# Patient Record
Sex: MALE | Race: WHITE | NOT HISPANIC OR LATINO | URBAN - METROPOLITAN AREA
[De-identification: names, ages, dates, MRNs, and addresses within clinical notes are randomized per-mention and may not be internally consistent; named-entity substitution may affect disease eponyms.]

---

## 2023-01-08 ENCOUNTER — EMERGENCY (EMERGENCY)
Facility: HOSPITAL | Age: 23
LOS: 1 days | Discharge: HOME | End: 2023-01-08
Attending: EMERGENCY MEDICINE | Admitting: EMERGENCY MEDICINE
Payer: COMMERCIAL

## 2023-01-08 VITALS
SYSTOLIC BLOOD PRESSURE: 132 MMHG | HEIGHT: 74 IN | HEART RATE: 76 BPM | WEIGHT: 220.02 LBS | OXYGEN SATURATION: 96 % | RESPIRATION RATE: 18 BRPM | DIASTOLIC BLOOD PRESSURE: 72 MMHG | TEMPERATURE: 98 F

## 2023-01-08 PROCEDURE — 99285 EMERGENCY DEPT VISIT HI MDM: CPT

## 2023-01-08 PROCEDURE — 99053 MED SERV 10PM-8AM 24 HR FAC: CPT

## 2023-01-08 RX ORDER — SIMETHICONE 80 MG/1
80 TABLET, CHEWABLE ORAL ONCE
Refills: 0 | Status: COMPLETED | OUTPATIENT
Start: 2023-01-08 | End: 2023-01-08

## 2023-01-08 RX ORDER — SODIUM CHLORIDE 9 MG/ML
1000 INJECTION INTRAMUSCULAR; INTRAVENOUS; SUBCUTANEOUS ONCE
Refills: 0 | Status: COMPLETED | OUTPATIENT
Start: 2023-01-08 | End: 2023-01-08

## 2023-01-08 RX ORDER — METOCLOPRAMIDE HCL 10 MG
10 TABLET ORAL ONCE
Refills: 0 | Status: COMPLETED | OUTPATIENT
Start: 2023-01-08 | End: 2023-01-08

## 2023-01-08 NOTE — ED ADULT TRIAGE NOTE - CHIEF COMPLAINT QUOTE
Walk in pt with complaints of chest pain/pressure accompanied by excessive belching since 4pm today after eating a meal.

## 2023-01-09 VITALS
SYSTOLIC BLOOD PRESSURE: 132 MMHG | OXYGEN SATURATION: 97 % | HEART RATE: 61 BPM | TEMPERATURE: 97 F | DIASTOLIC BLOOD PRESSURE: 80 MMHG | RESPIRATION RATE: 17 BRPM

## 2023-01-09 LAB
ALBUMIN SERPL ELPH-MCNC: 4.5 G/DL — SIGNIFICANT CHANGE UP (ref 3.4–5)
ALP SERPL-CCNC: 57 U/L — SIGNIFICANT CHANGE UP (ref 40–120)
ALT FLD-CCNC: 54 U/L — HIGH (ref 12–42)
ANION GAP SERPL CALC-SCNC: 8 MMOL/L — LOW (ref 9–16)
APTT BLD: 32 SEC — SIGNIFICANT CHANGE UP (ref 27.5–35.5)
AST SERPL-CCNC: 37 U/L — SIGNIFICANT CHANGE UP (ref 15–37)
BASOPHILS # BLD AUTO: 0.03 K/UL — SIGNIFICANT CHANGE UP (ref 0–0.2)
BASOPHILS NFR BLD AUTO: 0.4 % — SIGNIFICANT CHANGE UP (ref 0–2)
BILIRUB SERPL-MCNC: 1.3 MG/DL — HIGH (ref 0.2–1.2)
BUN SERPL-MCNC: 18 MG/DL — SIGNIFICANT CHANGE UP (ref 7–23)
CALCIUM SERPL-MCNC: 9.7 MG/DL — SIGNIFICANT CHANGE UP (ref 8.5–10.5)
CHLORIDE SERPL-SCNC: 104 MMOL/L — SIGNIFICANT CHANGE UP (ref 96–108)
CO2 SERPL-SCNC: 27 MMOL/L — SIGNIFICANT CHANGE UP (ref 22–31)
CREAT SERPL-MCNC: 0.94 MG/DL — SIGNIFICANT CHANGE UP (ref 0.5–1.3)
EGFR: 116 ML/MIN/1.73M2 — SIGNIFICANT CHANGE UP
EOSINOPHIL # BLD AUTO: 0.15 K/UL — SIGNIFICANT CHANGE UP (ref 0–0.5)
EOSINOPHIL NFR BLD AUTO: 2.1 % — SIGNIFICANT CHANGE UP (ref 0–6)
GLUCOSE SERPL-MCNC: 107 MG/DL — HIGH (ref 70–99)
HCT VFR BLD CALC: 43.6 % — SIGNIFICANT CHANGE UP (ref 39–50)
HGB BLD-MCNC: 15 G/DL — SIGNIFICANT CHANGE UP (ref 13–17)
HIV 1 & 2 AB SERPL IA.RAPID: SIGNIFICANT CHANGE UP
IMM GRANULOCYTES NFR BLD AUTO: 0.3 % — SIGNIFICANT CHANGE UP (ref 0–0.9)
INR BLD: 0.95 — SIGNIFICANT CHANGE UP (ref 0.88–1.16)
LIDOCAIN IGE QN: 88 U/L — SIGNIFICANT CHANGE UP (ref 73–393)
LYMPHOCYTES # BLD AUTO: 3.25 K/UL — SIGNIFICANT CHANGE UP (ref 1–3.3)
LYMPHOCYTES # BLD AUTO: 44.6 % — HIGH (ref 13–44)
MCHC RBC-ENTMCNC: 29.8 PG — SIGNIFICANT CHANGE UP (ref 27–34)
MCHC RBC-ENTMCNC: 34.4 GM/DL — SIGNIFICANT CHANGE UP (ref 32–36)
MCV RBC AUTO: 86.7 FL — SIGNIFICANT CHANGE UP (ref 80–100)
MONOCYTES # BLD AUTO: 0.99 K/UL — HIGH (ref 0–0.9)
MONOCYTES NFR BLD AUTO: 13.6 % — SIGNIFICANT CHANGE UP (ref 2–14)
NEUTROPHILS # BLD AUTO: 2.85 K/UL — SIGNIFICANT CHANGE UP (ref 1.8–7.4)
NEUTROPHILS NFR BLD AUTO: 39 % — LOW (ref 43–77)
NRBC # BLD: 0 /100 WBCS — SIGNIFICANT CHANGE UP (ref 0–0)
PLATELET # BLD AUTO: 284 K/UL — SIGNIFICANT CHANGE UP (ref 150–400)
POTASSIUM SERPL-MCNC: 4 MMOL/L — SIGNIFICANT CHANGE UP (ref 3.5–5.3)
POTASSIUM SERPL-SCNC: 4 MMOL/L — SIGNIFICANT CHANGE UP (ref 3.5–5.3)
PROT SERPL-MCNC: 7.7 G/DL — SIGNIFICANT CHANGE UP (ref 6.4–8.2)
PROTHROM AB SERPL-ACNC: 11 SEC — SIGNIFICANT CHANGE UP (ref 10.5–13.4)
RBC # BLD: 5.03 M/UL — SIGNIFICANT CHANGE UP (ref 4.2–5.8)
RBC # FLD: 12.8 % — SIGNIFICANT CHANGE UP (ref 10.3–14.5)
SODIUM SERPL-SCNC: 139 MMOL/L — SIGNIFICANT CHANGE UP (ref 132–145)
TROPONIN I, HIGH SENSITIVITY RESULT: 11.5 NG/L — SIGNIFICANT CHANGE UP
WBC # BLD: 7.29 K/UL — SIGNIFICANT CHANGE UP (ref 3.8–10.5)
WBC # FLD AUTO: 7.29 K/UL — SIGNIFICANT CHANGE UP (ref 3.8–10.5)

## 2023-01-09 PROCEDURE — 74177 CT ABD & PELVIS W/CONTRAST: CPT | Mod: 26

## 2023-01-09 PROCEDURE — 74019 RADEX ABDOMEN 2 VIEWS: CPT | Mod: 26

## 2023-01-09 PROCEDURE — 71046 X-RAY EXAM CHEST 2 VIEWS: CPT | Mod: 26

## 2023-01-09 RX ADMIN — Medication 104 MILLIGRAM(S): at 00:42

## 2023-01-09 RX ADMIN — SODIUM CHLORIDE 1000 MILLILITER(S): 9 INJECTION INTRAMUSCULAR; INTRAVENOUS; SUBCUTANEOUS at 00:43

## 2023-01-09 RX ADMIN — SIMETHICONE 80 MILLIGRAM(S): 80 TABLET, CHEWABLE ORAL at 00:42

## 2023-01-09 NOTE — ED PROVIDER NOTE - PHYSICAL EXAMINATION
VITAL SIGNS: I have reviewed nursing notes and confirm.  CONSTITUTIONAL: Well-developed; well-nourished; excessive belching.   SKIN: Skin is warm and dry, no acute rash.  HEAD: Normocephalic; atraumatic.  EYES: PERRL, EOM intact; conjunctiva and sclera clear.  ENT: No nasal discharge; airway clear.  NECK: Supple; non tender.  CARD: S1, S2 normal; no murmurs, gallops, or rubs. Regular rate and rhythm.  RESP: No wheezes, rales or rhonchi.  ABD: +Bloating, no guarding.  +TTP in LLQ, no TTP in RLQ or over McBurney's point.  Negative Grover's.   MSK: Normal ROM. No clubbing, cyanosis or edema.  NEURO: Alert, oriented. Grossly unremarkable.  PSYCH: Cooperative, appropriate.

## 2023-01-09 NOTE — ED PROVIDER NOTE - CARE PROVIDERS DIRECT ADDRESSES
[email or web address]

## 2023-01-09 NOTE — ED PROVIDER NOTE - CLINICAL SUMMARY MEDICAL DECISION MAKING FREE TEXT BOX
1. CP - pain is atypical and pt is low risk for ACS given age.  However, pt is concerned regarding his family history and he does abuse cocaine intermittently.  EKG is nonischemic.  We will check labs including troponin I and CXR.    2. Excessive bloating/belching.  Abd exam shows some mild TTP in the LLQ.  Will perform abx xray and labs.  Will give simethicone and Reglan and re-evaluate.

## 2023-01-09 NOTE — ED PROVIDER NOTE - PROVIDER TOKENS
PROVIDER:[TOKEN:[[id number]],FOLLOWUP:[1-3 Days]],PROVIDER:[TOKEN:[[id number]],FOLLOWUP:[1-3 Days]]

## 2023-01-09 NOTE — ED ADULT NURSE NOTE - BREATHING, MLM
Called TONO Mccarthy's office at [facility].  Left 2 detailed messages asking for images to pushed for pt. Left my direct line for a return call.   Spontaneous, unlabored and symmetrical

## 2023-01-09 NOTE — ED ADULT NURSE REASSESSMENT NOTE - NS ED NURSE REASSESS COMMENT FT1
Patient received from PARVIN Baldwin. Patient provided for rounding. Patient in no apparent distress. Resting comfortably. Patient provided for emotional support, comfort, safety, and review of plan of care. Will continue to monitor.
Patient made aware of lab and imaging results, and the need to f/u with pcp, verbalized understanding. Ambulated well without assistance.

## 2023-01-09 NOTE — ED PROVIDER NOTE - CARE PROVIDER_API CALL
Robin Olmos)  Internal Medicine  [address], [place] [id number]  Phone: [phone]  Fax: [phone]  Follow Up Time: 1-3 Days    Gwyn Nichole)  Internal Medicine  [address] [address]  Phone: [phone]  Fax: [phone]  Follow Up Time: 1-3 Days

## 2023-01-09 NOTE — ED PROVIDER NOTE - OBJECTIVE STATEMENT
Patient is a 22-year-old male with a history of ADHD (on Focalin, reports low-dose and no recent changes) and presents with chest pain and excessive belching since 4 PM this afternoon.  Patient reports symptoms started after eating a heavy meal of hamburger and fried food.  Reports chest pain is located to the middle of the chest and nonradiating.  Burning in quality.  He is concerned because he reports his father had a heart attack at the age of 62.  Father still living denies history of CABG or stents.  Patient reports excessive belching abdominal bloating but no specific abdominal pain.  Patient has had 2 bowel movements since onset of symptoms and reports they were normal in quality and color.  Denies any other symptoms.  Denies any abdominal surgeries.  Reports daily nicotine use via vaping but denies cigarette use.  Reports cocaine use, usually on the weekends only.  Denies other substances.  Social drinker.

## 2023-01-09 NOTE — ED PROVIDER NOTE - PROGRESS NOTE DETAILS
Ct resulted.  No appendicitis.  Discussed all results with pt.  Will have him f/u with his PCP.  he confirms he has a PCP.  Emergent return precautions discussed.

## 2023-01-09 NOTE — ED PROVIDER NOTE - PATIENT PORTAL LINK FT
You can access the FollowMyHealth Patient Portal offered by [facility] by registering at the following website: [email or web address]. By joining [employer]’s FollowMyHealth portal, you will also be able to view your health information using other applications (apps) compatible with our system.

## 2023-01-09 NOTE — ED PROVIDER NOTE - DISCHARGE REVIEW MATERIAL PRESENTED
SUBJECTIVE:  Jeff Chavez is a 38 year old male who comes in for evaluation of headache.  Headache began today and is still present    DESCRIPTION OF HEADACHE:   Location of pain: frontal   Radiation of pain?: NO   Character of pain:sharp   Severity of pain: moderate   Accompanying symptoms: nausea       History of Migranes: Yes   Are most headaches similar in presentation? YES        CURRENT USE OF MEDS TO TREAT HA:   Abortive meds? opiates    Daily use? NO   Prophylactic meds? none        No past medical history on file.  Current Outpatient Medications   Medication Sig Dispense Refill     HYDROcodone-acetaminophen (NORCO) 5-325 MG tablet Take 1 tablet by mouth       omeprazole (PRILOSEC) 20 MG DR capsule Take 20 mg by mouth       promethazine (PHENERGAN) 25 MG tablet Take 12.5-25 mg by mouth       propranolol (INDERAL) 40 MG tablet Take 40 mg by mouth       sucralfate (CARAFATE) 1 GM tablet Take 1 g by mouth       ZOLMitriptan (ZOMIG) 5 MG tablet Take 5 mg by mouth       ondansetron (ZOFRAN) 4 MG tablet        Social History     Tobacco Use     Smoking status: Current Every Day Smoker     Packs/day: 1.00   Substance Use Topics     Alcohol use: No       ROS:  10 point ROS negative except as listed above    OBJECTIVE:  /73   Pulse 112   Temp 98.2  F (36.8  C)   Wt 49.9 kg (110 lb)   SpO2 97%   BMI 18.88 kg/m    GENERAL APPEARANCE: healthy, alert and no distress  EYES: EOMI,  PERRL, conjunctiva clear  HENT: ear canals and TM's normal.  Nose and mouth without ulcers, erythema or lesions  NECK: supple, nontender, no lymphadenopathy  RESP: lungs clear to auscultation - no rales, rhonchi or wheezes  CV: regular rates and rhythm, normal S1 S2, no murmur noted  NEURO: Normal strength and tone, sensory exam grossly normal,  normal speech and mentation  SKIN: no suspicious lesions or rashes    ASSESSMENT:  (R51.9) Acute nonintractable headache, unspecified headache type  (primary encounter  diagnosis)  Comment: unable to resolve with typical medicine, requesting narcotics   Plan: advised follow up with PCP or ED for requested therapy         .

## 2023-01-09 NOTE — ED PROVIDER NOTE - NSICDXFAMILYHX_GEN_ALL_CORE_FT
FAMILY HISTORY:  Father  Still living? Yes, Estimated age: 61-70  FH: heart attack, Age at diagnosis: Age Unknown

## 2023-01-09 NOTE — ED ADULT NURSE NOTE - OBJECTIVE STATEMENT
Patient is a 22yoM with no pmh presenting to the ED for chest discomfort and abd pain. Pt is awake and alert, spont breathing. Patient states that had sudden onset of abd pain s/p having late lunch. Patient is a 22yoM with no pmh presenting to the ED for chest discomfort and abd pain. Pt is awake and alert, spont breathing. Patient states that had sudden onset of abd pain s/p having late lunch. Patient endorsing nausea without vomiting, also states that he has chest tightness, denies sob. Denies ha/dizziness.

## 2023-01-10 DIAGNOSIS — R10.84 GENERALIZED ABDOMINAL PAIN: ICD-10-CM

## 2023-01-10 DIAGNOSIS — R14.2 ERUCTATION: ICD-10-CM

## 2023-01-10 DIAGNOSIS — R07.89 OTHER CHEST PAIN: ICD-10-CM

## 2023-01-10 DIAGNOSIS — F90.9 ATTENTION-DEFICIT HYPERACTIVITY DISORDER, UNSPECIFIED TYPE: ICD-10-CM

## 2023-01-10 DIAGNOSIS — F17.210 NICOTINE DEPENDENCE, CIGARETTES, UNCOMPLICATED: ICD-10-CM

## 2023-01-11 PROBLEM — Z00.00 ENCOUNTER FOR PREVENTIVE HEALTH EXAMINATION: Status: ACTIVE | Noted: 2023-01-11

## 2023-01-13 PROBLEM — F90.9 ATTENTION-DEFICIT HYPERACTIVITY DISORDER, UNSPECIFIED TYPE: Chronic | Status: ACTIVE | Noted: 2023-01-09

## 2023-01-30 NOTE — ED PROVIDER NOTE - HISTORY ATTESTATION, MLM
I have reviewed, interpreted and responded to the remote patient monitoring date via telephone, [employer] benji and/or Direct Call messaging at least weekly for the last 30 days. Goal PAD : 20  Patient has maintained goal with interventions as documented in patient notes. See previous encounters.
I have reviewed and confirmed nurses' notes...

## 2023-06-16 ENCOUNTER — APPOINTMENT (OUTPATIENT)
Dept: INTERNAL MEDICINE | Facility: CLINIC | Age: 23
End: 2023-06-16